# Patient Record
Sex: FEMALE
[De-identification: names, ages, dates, MRNs, and addresses within clinical notes are randomized per-mention and may not be internally consistent; named-entity substitution may affect disease eponyms.]

---

## 2023-11-01 ENCOUNTER — DASHBOARD ENCOUNTERS (OUTPATIENT)
Age: 38
End: 2023-11-01

## 2023-11-01 ENCOUNTER — OFFICE VISIT (OUTPATIENT)
Dept: URBAN - NONMETROPOLITAN AREA CLINIC 4 | Facility: CLINIC | Age: 38
End: 2023-11-01
Payer: COMMERCIAL

## 2023-11-01 VITALS
HEIGHT: 63 IN | HEART RATE: 72 BPM | DIASTOLIC BLOOD PRESSURE: 83 MMHG | TEMPERATURE: 97.3 F | SYSTOLIC BLOOD PRESSURE: 121 MMHG | BODY MASS INDEX: 27.82 KG/M2 | WEIGHT: 157 LBS

## 2023-11-01 DIAGNOSIS — K62.5 RECTAL BLEEDING: ICD-10-CM

## 2023-11-01 DIAGNOSIS — R10.10 ACUTE UPPER ABDOMINAL PAIN: ICD-10-CM

## 2023-11-01 DIAGNOSIS — R20.8 RECTAL BURNING: ICD-10-CM

## 2023-11-01 DIAGNOSIS — R11.2 NAUSEA AND VOMITING, UNSPECIFIED VOMITING TYPE: ICD-10-CM

## 2023-11-01 DIAGNOSIS — R10.30 LOWER ABDOMINAL PAIN: ICD-10-CM

## 2023-11-01 PROBLEM — 119523007: Status: ACTIVE | Noted: 2023-11-01

## 2023-11-01 PROCEDURE — 99204 OFFICE O/P NEW MOD 45 MIN: CPT | Performed by: REGISTERED NURSE

## 2023-11-01 RX ORDER — METHYLPREDNISOLONE 4 MG/1
TAKE AS DIRECTED ON PACKAGE TABLET ORAL
Qty: 21 UNSPECIFIED | Refills: 0 | Status: ON HOLD | COMMUNITY

## 2023-11-01 RX ORDER — SEMAGLUTIDE 2.68 MG/ML
INJECT 2MG UNDER THE SKIN EVERY WEEK INJECTION, SOLUTION SUBCUTANEOUS
Qty: 3 UNSPECIFIED | Refills: 1 | Status: ACTIVE | COMMUNITY

## 2023-11-01 RX ORDER — MELOXICAM 15 MG/1
TAKE ONE TABLET BY MOUTH ONE TIME DAILY TABLET ORAL
Qty: 30 UNSPECIFIED | Refills: 0 | Status: ON HOLD | COMMUNITY

## 2023-11-01 RX ORDER — NAPROXEN 500 MG/1
TAKE ONE TABLET BY MOUTH TWICE A DAY (IN THE MORNING AND IN THE EVENING) WITH A MEAL FOR 15 DAYS TABLET ORAL
Qty: 30 UNSPECIFIED | Refills: 0 | Status: ON HOLD | COMMUNITY

## 2023-11-01 RX ORDER — DICYCLOMINE HYDROCHLORIDE 20 MG/1
1 TABLET TABLET ORAL THREE TIMES A DAY
Qty: 90 | Refills: 0 | OUTPATIENT
Start: 2023-11-01 | End: 2023-11-30

## 2023-11-01 NOTE — HPI-TODAY'S VISIT:
11/1/23: Pt is a 39 yo female with no significant PMH who is self referred for evaluation of abdominal pain and rectal bleeding.  Pt reports acute onset of abdominal pain and N/V about 2 weeks ago. States her kids had strep throat. Pain is in upper abdomen and BL lower abdomen. Pain is aching in nature. She went to  and was tested for COVID and strep which wer negative. About 6 days ago, she noticed streaks of blood on stool and in toilet bowl. Reports stools slightly harder than usual and she has a burning sensation in rectum. Denies rectal pain, diarrhea, fever, chills, unintentional weight loss. Abdominal pain is improved. No further vomiting. She started Ozempic about 9 weeks ago and reports SE's of diarrhea and nausea. Diarrhea resolved, but she continues to have baseline nausea. Denies FXh of IBD, celiac, CRC. Had recent labs by PCP about 3-4 weeks ago that were reportedly normal. Has appt with GYN on Friday

## 2023-11-20 ENCOUNTER — OFFICE VISIT (OUTPATIENT)
Dept: URBAN - NONMETROPOLITAN AREA CLINIC 4 | Facility: CLINIC | Age: 38
End: 2023-11-20